# Patient Record
Sex: MALE | Race: ASIAN | Employment: OTHER | URBAN - METROPOLITAN AREA
[De-identification: names, ages, dates, MRNs, and addresses within clinical notes are randomized per-mention and may not be internally consistent; named-entity substitution may affect disease eponyms.]

---

## 2017-12-26 ENCOUNTER — HOSPITAL ENCOUNTER (EMERGENCY)
Age: 67
Discharge: HOME | End: 2017-12-26
Attending: EMERGENCY MEDICINE
Payer: COMMERCIAL

## 2017-12-26 ENCOUNTER — APPOINTMENT (OUTPATIENT)
Dept: GENERAL RADIOLOGY | Age: 67
End: 2017-12-26
Payer: COMMERCIAL

## 2017-12-26 VITALS
HEIGHT: 70 IN | OXYGEN SATURATION: 100 % | WEIGHT: 154.32 LBS | RESPIRATION RATE: 16 BRPM | SYSTOLIC BLOOD PRESSURE: 132 MMHG | DIASTOLIC BLOOD PRESSURE: 66 MMHG | TEMPERATURE: 97.9 F | HEART RATE: 76 BPM | BODY MASS INDEX: 22.09 KG/M2

## 2017-12-26 DIAGNOSIS — S76.319A HAMSTRING STRAIN, INITIAL ENCOUNTER: Primary | ICD-10-CM

## 2017-12-26 PROCEDURE — 6370000000 HC RX 637 (ALT 250 FOR IP): Performed by: PHYSICIAN ASSISTANT

## 2017-12-26 PROCEDURE — 99283 EMERGENCY DEPT VISIT LOW MDM: CPT

## 2017-12-26 PROCEDURE — 73562 X-RAY EXAM OF KNEE 3: CPT

## 2017-12-26 RX ORDER — IBUPROFEN 600 MG/1
600 TABLET ORAL EVERY 8 HOURS PRN
Qty: 30 TABLET | Refills: 0 | Status: SHIPPED | OUTPATIENT
Start: 2017-12-26

## 2017-12-26 RX ORDER — IBUPROFEN 600 MG/1
600 TABLET ORAL ONCE
Status: COMPLETED | OUTPATIENT
Start: 2017-12-26 | End: 2017-12-26

## 2017-12-26 RX ADMIN — IBUPROFEN 600 MG: 600 TABLET, FILM COATED ORAL at 11:29

## 2017-12-26 ASSESSMENT — ENCOUNTER SYMPTOMS
NAUSEA: 0
EYE DISCHARGE: 0
ABDOMINAL PAIN: 0
VOMITING: 0
COUGH: 0
BACK PAIN: 0
EYE REDNESS: 0
SORE THROAT: 0
SHORTNESS OF BREATH: 0

## 2017-12-26 ASSESSMENT — PAIN SCALES - GENERAL: PAINLEVEL_OUTOF10: 2

## 2017-12-26 ASSESSMENT — PAIN DESCRIPTION - PROGRESSION: CLINICAL_PROGRESSION: GRADUALLY IMPROVING

## 2017-12-26 NOTE — ED NOTES
Pt presents to ED with complaint of right knee pain. Pt reports that knee pain initially started a few months ago. He fell two days ago on the ice and states that pain has increased. PMS intact, no obvious swelling or deformity is noted. Pt has been taking motrin at home and does state that it helps.  He has not had any tylenol or motrin today     Judie Domingo RN  12/26/17 [id number]

## 2017-12-26 NOTE — ED PROVIDER NOTES
NONFORMULARY    Unknown blood pressure medication     ALLERGIES     has No Known Allergies. FAMILY HISTORY     Not relevant to the current problem. SOCIAL HISTORY      reports that he has never smoked. He has never used smokeless tobacco. He reports that he does not drink alcohol or use drugs. PHYSICAL EXAM     (7 for level 4, 8 or more for level 5)    ED Triage Vitals [12/26/17 1106]   BP Temp Temp Source Pulse Resp SpO2 Height Weight   132/66 97.9 °F (36.6 °C) Oral 76 16 100 % 5' 10\" (1.778 m) 154 lb 5.2 oz (70 kg)     Physical Exam   Constitutional: He appears well-developed and well-nourished. No distress. Nontoxic. HENT:   Head: Normocephalic and atraumatic. Eyes: No scleral icterus. Cardiovascular: Normal rate, regular rhythm and normal heart sounds. Pulmonary/Chest: Effort normal. No respiratory distress. Musculoskeletal:        Right knee: He exhibits normal range of motion, no swelling, no effusion, no ecchymosis, no deformity, no LCL laxity and no MCL laxity. Legs:  Pain over the posterior aspect of the right knee; tenderness with palpation over the right hamstrings. Neurological: He is alert. Grossly intact. Skin: Skin is warm and dry. No rash noted. Vitals reviewed. DIAGNOSTIC RESULTS     RADIOLOGY:   Radiology images were visualized by myself. The Radiologist interpretations were reviewed and are as follows:     XR KNEE RIGHT (3 VIEWS) (Preliminary result)   Result time 12/26/17 12:04:52   Preliminary result by Michael Voss MD (12/26/17 12:04:52)                Impression:    No acute findings. Narrative:    EXAMINATION:  3 VIEWS OF THE RIGHT KNEE    12/26/2017 11:44 am    COMPARISON:  None.     HISTORY:  ORDERING SYSTEM PROVIDED HISTORY: pain; fall 2 days ago  TECHNOLOGIST PROVIDED HISTORY:  Reason for exam:->pain; fall 2 days ago  Ordering Physician Provided Reason for Exam: pain to back of rt knee after a  fall 2 days ago  Acuity: Acute  Type of Exam: Initial  Mechanism of Injury: fall 2 days ago    FINDINGS:  No acute fracture.  No significant joint space narrowing.  No joint effusion. Minimal osteophyte formation.                Preliminary result by Tylor Knight MD (12/26/17 12:00:51)                Impression:    No acute findings.                  LABS:  No results found for this visit on 12/26/17. MDM:   Patient presents to the ER for evaluation of right knee pain. Patient states that he slipped and fell 2 days ago. He complains of discomfort over the posterior aspect of the knee that radiates up the posterior aspect of the thigh. He is able to ambulate. I will obtain a plain film x-ray of the right knee to evaluate for acute fracture or dislocation. Patient's acute pain will be treated with ibuprofen. EMERGENCY DEPARTMENT COURSE:   Vitals:    Vitals:    12/26/17 1106   BP: 132/66   Pulse: 76   Resp: 16   Temp: 97.9 °F (36.6 °C)   TempSrc: Oral   SpO2: 100%   Weight: 70 kg (154 lb 5.2 oz)   Height: 5' 10\" (1.778 m)     -------------------------  BP: 132/66, Temp: 97.9 °F (36.6 °C), Pulse: 76, Resp: 16    The patient was given the following medications:  Orders Placed This Encounter   Medications    ibuprofen (ADVIL;MOTRIN) tablet 600 mg    ibuprofen (ADVIL;MOTRIN) 600 MG tablet     Sig: Take 1 tablet by mouth every 8 hours as needed for Pain     Dispense:  30 tablet     Refill:  0      Re-evaluation Notes  12:21 PM.  Results of the right knee x-ray was discussed with the patient by Dr. Lito Murray. X-ray showing no evidence of acute fracture or dislocation. I feel that the patient has sustained a hamstring strain. Ace wrap will be applied to the right thigh. Patient will be placed on crutches. Patient will be prescribed ibuprofen. Follow-up evaluation with primary care doctor in the next 2-3 days. An Ace wrap was applied to the right thigh by the nursing staff. Patient tolerated the procedure well.   The right lower extremity is neurovascularly intact following placement of the Ace wrap. FINAL IMPRESSION      1.  Hamstring strain, initial encounter        DISPOSITION/PLAN   DISPOSITION - home     Condition on Disposition  Stable    PATIENT REFERRED TO:  Primary Care Physician    Schedule an appointment as soon as possible for a visit in 3 days  For reevaluation    DISCHARGE MEDICATIONS:  New Prescriptions    IBUPROFEN (ADVIL;MOTRIN) 600 MG TABLET    Take 1 tablet by mouth every 8 hours as needed for Pain     (Please note that portions of this note were completed with a voice recognition program.  Efforts were made to edit the dictations but occasionally words are mis-transcribed.)    Dannie Ryan PA-C  12/26/17 30[address] [facility] Goel PA-C  12/26/17 [id number]